# Patient Record
(demographics unavailable — no encounter records)

---

## 2025-04-16 NOTE — HISTORY OF PRESENT ILLNESS
[AJCC Stage: ____] : AJCC Stage: [unfilled] [de-identified] : Mr. Reyesbustillo is a 42 year old gentleman with reported insignificant past medical history of who presents in the setting of weight loss, dizziness and nausea and now found to have significant disease burden in the liver consistent with cholangiocarcinoma vs metastatic disease.  Mr. Reyes reports that he was at his baseline state of health until ~ 3-4 months ago with the onset of nausea and weight loss. He was eventually seen by his PCP who referred him to a hepatologist (Dr. Vance) and underwent CT Scan which identified significant disease burden. He subsequently received MR Liver and biopsy 4/8/25 which was CK7 +, CDX2 negative and concerning for primary intrahepatic cholangiocarcinoma versus metastatic disease of unknown primary.  During hospitalizaiton AST//50 with elevated alk phos, Bili 1-2, AFP 32, CA 19-9 85.Smooth muscle antibody 1:20.   Currently is functional, up and active. Some sleep disturbances and a feeling of being "dazed". Weight loss ~ 20 lbs over the last 4 months.   No significant family history of cancer.   Molecular: Foundation requested Disease: CUP (likely Cholangiocarcinoma)

## 2025-04-16 NOTE — BEGINNING OF VISIT
[Detailed Documented Plan in Note] : Detailed Documented Plan in Note [0] : 2) Feeling down, depressed, or hopeless: Not at all (0) [PHQ-2 Negative] : PHQ-2 Negative [[id number]] : 0 [Never] : Never [Patient advised of risk of tobacco use and smoking cessation discussed.] : Patient advised of risk of tobacco use and smoking cessation discussed. [Date Discussed (MM/DD/YY): ___] : Discussed: [unfilled] [With Patient/Caregiver] : with Patient/Caregiver [Abdominal Pain] : no abdominal pain

## 2025-04-16 NOTE — RESULTS/DATA
[FreeTextEntry1] : FNA Liver 4/8/25: Immunohistochemical stains  : The neoplastic cells are positive for CK7 and CAM 5.2 while  negative  or CK20, HepPar, Arginase, NKX3.1, TTF-1, CDX-2 and P40. RAIN-3 is weak positive in few cells.  Given these immunohistochemical findings, a definite primary site cannot be established; possibilities include upper gastrointestinal, pancreatobiliary (including intrahepatic cholangiocarcinoma) amongst others Correlation with clinical presentation and radiologic studies is advised.

## 2025-04-16 NOTE — ASSESSMENT
[FreeTextEntry1] : Mr. Reyesbustillo is a 42 year old gentleman presenting in the setting of Cancer of Unknown Primary, highly suspicious for intra-hepatic cholangiocarcinoma based on distribution of disease and pathologic findings.   Today we discussed his recent symptomatic burden, the natural history of cholangiocarcinoma, expectations for the future, and the next steps including port placement and initiation of systemic therapy. We went over goals of therapy, broadly, and highlighted the palliative nature of treatment. I highlighted that prognosis was measured in months to potentially a small number of years, but that his overall burden of disease concerned me, as did his declining functional status. We discussed the TOPAZ-1 regimen based on my suspicion that his primary malignancy represents a cholangiocarcinoma, and we discussed the risks, benefits, and side effects of treatment. He and his family were given the opportunity to ask questions. He will followup with Dr. Carolina for the initiation of C1.   #CUP/Cholangiocarcinoma: - Plan for TOPAZ-1; [place] + Cis + Durva - Referral to psycho-oncology (discussion, not medication) - Nutrition referral - PET/CT r/o extra-hepatic disease and alternative primary site (confirm liver-only) - Port - Foundation testing on tumor tissue - Not interested in fertility preservation

## 2025-04-16 NOTE — REASON FOR VISIT
[Initial Consultation] : an initial consultation [Interpreters_IDNumber] : [id number] [Interpreters_FullName] : Earnestine

## 2025-04-16 NOTE — HISTORY OF PRESENT ILLNESS
[AJCC Stage: ____] : AJCC Stage: [unfilled] [de-identified] : Mr. Reyesbustillo is a 42 year old gentleman with reported insignificant past medical history of who presents in the setting of weight loss, dizziness and nausea and now found to have significant disease burden in the liver consistent with cholangiocarcinoma vs metastatic disease.  Mr. Reyes reports that he was at his baseline state of health until ~ 3-4 months ago with the onset of nausea and weight loss. He was eventually seen by his PCP who referred him to a hepatologist (Dr. Vance) and underwent CT Scan which identified significant disease burden. He subsequently received MR Liver and biopsy 4/8/25 which was CK7 +, CDX2 negative and concerning for primary intrahepatic cholangiocarcinoma versus metastatic disease of unknown primary.  During hospitalizaiton AST//50 with elevated alk phos, Bili 1-2, AFP 32, CA 19-9 85.Smooth muscle antibody 1:20.   Currently is functional, up and active. Some sleep disturbances and a feeling of being "dazed". Weight loss ~ 20 lbs over the last 4 months.   No significant family history of cancer.   Molecular: Foundation requested Disease: CUP (likely Cholangiocarcinoma)

## 2025-04-16 NOTE — HISTORY OF PRESENT ILLNESS
[AJCC Stage: ____] : AJCC Stage: [unfilled] [de-identified] : Mr. Reyesbustillo is a 42 year old gentleman with reported insignificant past medical history of who presents in the setting of weight loss, dizziness and nausea and now found to have significant disease burden in the liver consistent with cholangiocarcinoma vs metastatic disease.  Mr. Reyes reports that he was at his baseline state of health until ~ 3-4 months ago with the onset of nausea and weight loss. He was eventually seen by his PCP who referred him to a hepatologist (Dr. Vance) and underwent CT Scan which identified significant disease burden. He subsequently received MR Liver and biopsy 4/8/25 which was CK7 +, CDX2 negative and concerning for primary intrahepatic cholangiocarcinoma versus metastatic disease of unknown primary.  During hospitalizaiton AST//50 with elevated alk phos, Bili 1-2, AFP 32, CA 19-9 85.Smooth muscle antibody 1:20.   Currently is functional, up and active. Some sleep disturbances and a feeling of being "dazed". Weight loss ~ 20 lbs over the last 4 months.   No significant family history of cancer.   Molecular: Foundation requested Disease: CUP (likely Cholangiocarcinoma)

## 2025-04-16 NOTE — PHYSICAL EXAM
[Restricted in physically strenuous activity but ambulatory and able to carry out work of a light or sedentary nature] : Status 1- Restricted in physically strenuous activity but ambulatory and able to carry out work of a light or sedentary nature, e.g., light house work, office work [Normal] : grossly intact [de-identified] : Jaundiced [de-identified] : Icteric [de-identified] : Distended abdomen [de-identified] : Jaundiced  [de-identified] : Tearful Tearful

## 2025-04-16 NOTE — REASON FOR VISIT
[Initial Consultation] : an initial consultation [Interpreters_IDNumber] : [id number] [Interpreters_FullName] : Earnestine

## 2025-04-16 NOTE — PHYSICAL EXAM
[Restricted in physically strenuous activity but ambulatory and able to carry out work of a light or sedentary nature] : Status 1- Restricted in physically strenuous activity but ambulatory and able to carry out work of a light or sedentary nature, e.g., light house work, office work [Normal] : grossly intact [de-identified] : Jaundiced [de-identified] : Icteric [de-identified] : Distended abdomen [de-identified] : Jaundiced  [de-identified] : Tearful Tearful

## 2025-04-16 NOTE — PHYSICAL EXAM
[Restricted in physically strenuous activity but ambulatory and able to carry out work of a light or sedentary nature] : Status 1- Restricted in physically strenuous activity but ambulatory and able to carry out work of a light or sedentary nature, e.g., light house work, office work [Normal] : grossly intact [de-identified] : Jaundiced [de-identified] : Icteric [de-identified] : Distended abdomen [de-identified] : Jaundiced  [de-identified] : Tearful Tearful

## 2025-04-16 NOTE — ASSESSMENT
[FreeTextEntry1] : Mr. Reyesbustillo is a 42 year old gentleman presenting in the setting of Cancer of Unknown Primary, highly suspicious for intra-hepatic cholangiocarcinoma based on distribution of disease and pathologic findings.   Today we discussed his recent symptomatic burden, the natural history of cholangiocarcinoma, expectations for the future, and the next steps including port placement and initiation of systemic therapy. We went over goals of therapy, broadly, and highlighted the palliative nature of treatment. I highlighted that prognosis was measured in months to potentially a small number of years, but that his overall burden of disease concerned me, as did his declining functional status. We discussed the TOPAZ-1 regimen based on my suspicion that his primary malignancy represents a cholangiocarcinoma, and we discussed the risks, benefits, and side effects of treatment. He and his family were given the opportunity to ask questions. He will followup with Dr. Carolina for the initiation of C1.   #CUP/Cholangiocarcinoma: - Plan for TOPAZ-1; [place] + Cis + Durva - Referral to psycho-oncology (discussion, not medication) - Nutrition referral - PET/CT r/o extra-hepatic disease and alternative primary site (confirm liver-only) - Port - Foundation testing on tumor tissue - Not interested in fertility preservation

## 2025-04-16 NOTE — REASON FOR VISIT
[Initial Consultation] : an initial consultation [Interpreters_IDNumber] : [id number] [Interpreters_FullName] : Earnestine

## 2025-04-16 NOTE — BEGINNING OF VISIT
[Detailed Documented Plan in Note] : Detailed Documented Plan in Note [0] : 2) Feeling down, depressed, or hopeless: Not at all (0) [PHQ-2 Negative] : PHQ-2 Negative [[id number]] : 0 [Never] : Never [Patient advised of risk of tobacco use and smoking cessation discussed.] : Patient advised of risk of tobacco use and smoking cessation discussed. [Date Discussed (MM/DD/YY): ___] : Discussed: [unfilled] [With Patient/Caregiver] : with Patient/Caregiver [Abdominal Pain] : no abdominal pain

## 2025-04-16 NOTE — ASSESSMENT
[FreeTextEntry1] : Mr. Reyesbustillo is a 42 year old gentleman presenting in the setting of Cancer of Unknown Primary, highly suspicious for intra-hepatic cholangiocarcinoma based on distribution of disease and pathologic findings.   Today we discussed his recent symptomatic burden, the natural history of cholangiocarcinoma, expectations for the future, and the next steps including port placement and initiation of systemic therapy. We went over goals of therapy, broadly, and highlighted the palliative nature of treatment. I highlighted that prognosis was measured in months to potentially a small number of years, but that his overall burden of disease concerned me, as did his declining functional status. We discussed the TOPAZ-1 regimen based on my suspicion that his primary malignancy represents a cholangiocarcinoma, and we discussed the risks, benefits, and side effects of treatment. He and his family were given the opportunity to ask questions. He will followup with Dr. Carolina for the initiation of C1.   #CUP/Cholangiocarcinoma: - Plan for TOPAZ-1; [place] + Cis + Durva - Referral to psycho-oncology (discussion, not medication) - Nutrition referral - PET/CT r/o extra-hepatic disease and alternative primary site (confirm liver-only) - Port - Foundation testing on tumor tissue - Not interested in fertility preservation

## 2025-04-16 NOTE — BEGINNING OF VISIT
[Detailed Documented Plan in Note] : Detailed Documented Plan in Note [0] : 2) Feeling down, depressed, or hopeless: Not at all (0) [PHQ-2 Negative] : PHQ-2 Negative [[id number]] : 0 [Never] : Never [Patient advised of risk of tobacco use and smoking cessation discussed.] : Patient advised of risk of tobacco use and smoking cessation discussed. [Date Discussed (MM/DD/YY): ___] : Discussed: [unfilled] [With Patient/Caregiver] : with Patient/Caregiver [Abdominal Pain] : no abdominal pain

## 2025-04-23 NOTE — PROCEDURE
[FreeTextEntry1] : mediport insertion [D/C IV on discharge] : D/C IV on discharge [Resume diet] : resume diet [Site check for bleeding/hematoma] : Site check for bleeding/hematoma [Vital signs on admission the q 15 mins x2] : Vital signs on admission the q 15 mins x2

## 2025-04-23 NOTE — PAST MEDICAL HISTORY
[Increasing age ( >40 years old)] : Increasing age ( >40 years old) [Malignancy] : Malignancy [No therapy indicated for cases scheduled for less than one hour] : No therapy indicated for cases scheduled for less than one hour. [FreeTextEntry1] : Malignant Hyperthermia Screening Tool and Risk of Bleeding Assessment  Mr. ALEXI REYESBUSTILLO denies family history of unexpected death following Anesthesia or Exercise. Denies Family history of Malignant Hyperthermia, Muscle or Neuromuscular disorder and High Temperature following exercise.  Mr. ALEXI REYESBUSTILLO denies history of Muscle Spasm, Dark or Chocolate - Colored urine and Unanticipated fever immediately following anesthesia or serious exercise.  Mr. REYESBUSTILLO also denies bleeding tendencies/ Risks of Bleeding.

## 2025-05-01 NOTE — PHYSICAL EXAM
[Restricted in physically strenuous activity but ambulatory and able to carry out work of a light or sedentary nature] : Status 1- Restricted in physically strenuous activity but ambulatory and able to carry out work of a light or sedentary nature, e.g., light house work, office work [Normal] : grossly intact [de-identified] : Jaundiced [de-identified] : Icteric [de-identified] : Distended abdomen [de-identified] : Jaundiced  [de-identified] : Tearful Tearful

## 2025-05-01 NOTE — HISTORY OF PRESENT ILLNESS
[AJCC Stage: ____] : AJCC Stage: [unfilled] [Disease: _____________________] : Disease: [unfilled] [M: ___] : M[unfilled] [de-identified] : Mr. Reyesbustillo is a 42 year old gentleman with reported insignificant past medical history of who presents in the setting of weight loss, dizziness and nausea and now found to have significant disease burden in the liver consistent with cholangiocarcinoma vs metastatic disease.  Mr. Reyes reports that he was at his baseline state of health until ~ 3-4 months ago with the onset of nausea and weight loss. He was eventually seen by his PCP who referred him to a hepatologist (Dr. Vance) and underwent CT Scan which identified significant disease burden. He subsequently received MR Liver and biopsy 4/8/25 which was CK7 +, CDX2 negative and concerning for primary intrahepatic cholangiocarcinoma versus metastatic disease of unknown primary.  During hospitalizaiton AST//50 with elevated alk phos, Bili 1-2, AFP 32, CA 19-9 85.Smooth muscle antibody 1:20.   Currently is functional, up and active. Some sleep disturbances and a feeling of being "dazed". Weight loss ~ 20 lbs over the last 4 months.      Molecular: Foundation requested Disease: CUP (likely Cholangiocarcinoma) [de-identified] : adenocarcinoma  [de-identified] : CA 19-9, AFP  [FreeTextEntry1] : initial visit  [de-identified] : denies any pain today  lost 6 lbs in 3 weeks  no n/v lives with dad, aunt in [place]  + blurry vision x 3 mos  no diarrhea/constipation  no EGD/colo

## 2025-05-01 NOTE — PHYSICAL EXAM
[Restricted in physically strenuous activity but ambulatory and able to carry out work of a light or sedentary nature] : Status 1- Restricted in physically strenuous activity but ambulatory and able to carry out work of a light or sedentary nature, e.g., light house work, office work [Normal] : grossly intact [de-identified] : Jaundiced [de-identified] : Icteric [de-identified] : Distended abdomen [de-identified] : Jaundiced  [de-identified] : Tearful Tearful

## 2025-05-01 NOTE — HISTORY OF PRESENT ILLNESS
[AJCC Stage: ____] : AJCC Stage: [unfilled] [Disease: _____________________] : Disease: [unfilled] [M: ___] : M[unfilled] [de-identified] : Mr. Reyesbustillo is a 42 year old gentleman with reported insignificant past medical history of who presents in the setting of weight loss, dizziness and nausea and now found to have significant disease burden in the liver consistent with cholangiocarcinoma vs metastatic disease.  Mr. Reyes reports that he was at his baseline state of health until ~ 3-4 months ago with the onset of nausea and weight loss. He was eventually seen by his PCP who referred him to a hepatologist (Dr. Vance) and underwent CT Scan which identified significant disease burden. He subsequently received MR Liver and biopsy 4/8/25 which was CK7 +, CDX2 negative and concerning for primary intrahepatic cholangiocarcinoma versus metastatic disease of unknown primary.  During hospitalizaiton AST//50 with elevated alk phos, Bili 1-2, AFP 32, CA 19-9 85.Smooth muscle antibody 1:20.   Currently is functional, up and active. Some sleep disturbances and a feeling of being "dazed". Weight loss ~ 20 lbs over the last 4 months.      Molecular: Foundation requested Disease: CUP (likely Cholangiocarcinoma) [de-identified] : adenocarcinoma  [de-identified] : CA 19-9, AFP  [FreeTextEntry1] : initial visit  [de-identified] : denies any pain today  lost 6 lbs in 3 weeks  no n/v lives with dad, aunt in [place]  + blurry vision x 3 mos  no diarrhea/constipation  no EGD/colo

## 2025-05-01 NOTE — REASON FOR VISIT
[Initial Consultation] : an initial consultation [Family Member] : family member [FreeTextEntry2] : Stage IV cholangiocarcinoma

## 2025-05-08 NOTE — HISTORY OF PRESENT ILLNESS
[Disease: _____________________] : Disease: [unfilled] [M: ___] : M[unfilled] [AJCC Stage: ____] : AJCC Stage: [unfilled] [Therapy: ___] : Therapy: [unfilled] [Cycle: ___] : Cycle: [unfilled] [Day: ___] : Day: [unfilled] [de-identified] : Mr. Reyesbustillo is a 42 year old gentleman with reported insignificant past medical history of who presents in the setting of weight loss, dizziness and nausea and now found to have significant disease burden in the liver consistent with cholangiocarcinoma vs metastatic disease.  Mr. Reyes reports that he was at his baseline state of health until ~ 3-4 months ago with the onset of nausea and weight loss. He was eventually seen by his PCP who referred him to a hepatologist (Dr. Vance) and underwent CT Scan which identified significant disease burden. He subsequently received MR Liver and biopsy 4/8/25 which was CK7 +, CDX2 negative and concerning for primary intrahepatic cholangiocarcinoma versus metastatic disease of unknown primary.  During hospitalizaiton AST//50 with elevated alk phos, Bili 1-2, AFP 32, CA 19-9 85.Smooth muscle antibody 1:20.   Currently is functional, up and active. Some sleep disturbances and a feeling of being "dazed". Weight loss ~ 20 lbs over the last 4 months.      Molecular: Foundation requested Disease: CUP (likely Cholangiocarcinoma)  5/1/25: C1D1 Gemzar/Cisplatin/Durvalumab  5/8/25: C1D8  [de-identified] : adenocarcinoma  [de-identified] : CA 19-9, AFP  [de-identified] : Here for treatment. Accompanied by his father  Tolerated treatment pretty well  Did feel his voice was lower in volume that has since improved  Otherwise no new compaints  Denies v/c/d/c, pain

## 2025-05-08 NOTE — REASON FOR VISIT
[Follow-Up Visit] : a follow-up [Family Member] : family member [FreeTextEntry2] : Stage IV cholangiocarcinoma

## 2025-05-08 NOTE — PHYSICAL EXAM
[Restricted in physically strenuous activity but ambulatory and able to carry out work of a light or sedentary nature] : Status 1- Restricted in physically strenuous activity but ambulatory and able to carry out work of a light or sedentary nature, e.g., light house work, office work [Normal] : affect appropriate [de-identified] : Icteric [de-identified] : Distended abdomen [de-identified] :  Tearful

## 2025-05-08 NOTE — REVIEW OF SYSTEMS
[Fatigue] : fatigue [Diarrhea: Grade 0] : Diarrhea: Grade 0 [Negative] : Allergic/Immunologic [FreeTextEntry4] : +dysphonia

## 2025-05-14 NOTE — PHYSICAL EXAM
[Restricted in physically strenuous activity but ambulatory and able to carry out work of a light or sedentary nature] : Status 1- Restricted in physically strenuous activity but ambulatory and able to carry out work of a light or sedentary nature, e.g., light house work, office work [Normal] : affect appropriate [de-identified] : Icteric [de-identified] : Distended abdomen [de-identified] :  Tearful

## 2025-05-14 NOTE — HISTORY OF PRESENT ILLNESS
[Disease: _____________________] : Disease: [unfilled] [M: ___] : M[unfilled] [AJCC Stage: ____] : AJCC Stage: [unfilled] [Therapy: ___] : Therapy: [unfilled] [Cycle: ___] : Cycle: [unfilled] [Day: ___] : Day: [unfilled] [de-identified] : Mr. Reyesbustillo is a 42 year old gentleman with reported insignificant past medical history of who presents in the setting of weight loss, dizziness and nausea and now found to have significant disease burden in the liver consistent with cholangiocarcinoma vs metastatic disease.  Mr. Reyes reports that he was at his baseline state of health until ~ 3-4 months ago with the onset of nausea and weight loss. He was eventually seen by his PCP who referred him to a hepatologist (Dr. Vance) and underwent CT Scan which identified significant disease burden. He subsequently received MR Liver and biopsy 4/8/25 which was CK7 +, CDX2 negative and concerning for primary intrahepatic cholangiocarcinoma versus metastatic disease of unknown primary.  During hospitalizaiton AST//50 with elevated alk phos, Bili 1-2, AFP 32, CA 19-9 85.Smooth muscle antibody 1:20.   Currently is functional, up and active. Some sleep disturbances and a feeling of being "dazed". Weight loss ~ 20 lbs over the last 4 months.       5/1/25: C1D1 Gemzar/Cisplatin/Durvalumab  5/8/25: C1D8  [de-identified] : adenocarcinoma  [de-identified] : CA 19-9, AFP  [de-identified] : was confused about meds and took dex 8 daily for 10 days, stopped 3 days ago when identified the error.  overall feels ok, appetite has improved, weight is stable.  BP remains elevated energy level is stable  tolerated first cycle of treatment well  yesterday felt like abdomen was distended, tight. today is better.  + belching, passing gas

## 2025-05-14 NOTE — PHYSICAL EXAM
[Restricted in physically strenuous activity but ambulatory and able to carry out work of a light or sedentary nature] : Status 1- Restricted in physically strenuous activity but ambulatory and able to carry out work of a light or sedentary nature, e.g., light house work, office work [Normal] : affect appropriate [de-identified] : Icteric [de-identified] : Distended abdomen [de-identified] :  Tearful

## 2025-05-14 NOTE — HISTORY OF PRESENT ILLNESS
[Disease: _____________________] : Disease: [unfilled] [M: ___] : M[unfilled] [AJCC Stage: ____] : AJCC Stage: [unfilled] [Therapy: ___] : Therapy: [unfilled] [Cycle: ___] : Cycle: [unfilled] [Day: ___] : Day: [unfilled] [de-identified] : Mr. Reyesbustillo is a 42 year old gentleman with reported insignificant past medical history of who presents in the setting of weight loss, dizziness and nausea and now found to have significant disease burden in the liver consistent with cholangiocarcinoma vs metastatic disease.  Mr. Reyes reports that he was at his baseline state of health until ~ 3-4 months ago with the onset of nausea and weight loss. He was eventually seen by his PCP who referred him to a hepatologist (Dr. Vance) and underwent CT Scan which identified significant disease burden. He subsequently received MR Liver and biopsy 4/8/25 which was CK7 +, CDX2 negative and concerning for primary intrahepatic cholangiocarcinoma versus metastatic disease of unknown primary.  During hospitalizaiton AST//50 with elevated alk phos, Bili 1-2, AFP 32, CA 19-9 85.Smooth muscle antibody 1:20.   Currently is functional, up and active. Some sleep disturbances and a feeling of being "dazed". Weight loss ~ 20 lbs over the last 4 months.       5/1/25: C1D1 Gemzar/Cisplatin/Durvalumab  5/8/25: C1D8  [de-identified] : adenocarcinoma  [de-identified] : CA 19-9, AFP  [de-identified] : was confused about meds and took dex 8 daily for 10 days, stopped 3 days ago when identified the error.  overall feels ok, appetite has improved, weight is stable.  BP remains elevated energy level is stable  tolerated first cycle of treatment well  yesterday felt like abdomen was distended, tight. today is better.  + belching, passing gas

## 2025-05-30 NOTE — PHYSICAL EXAM
[Restricted in physically strenuous activity but ambulatory and able to carry out work of a light or sedentary nature] : Status 1- Restricted in physically strenuous activity but ambulatory and able to carry out work of a light or sedentary nature, e.g., light house work, office work [Normal] : affect appropriate [de-identified] : Icteric [de-identified] : CTA b/l w/ decrease breath sounds LLL  [de-identified] : +tachycardia  [de-identified] : Distended abdomen [de-identified] :  Tearful

## 2025-05-30 NOTE — PHYSICAL EXAM
[Restricted in physically strenuous activity but ambulatory and able to carry out work of a light or sedentary nature] : Status 1- Restricted in physically strenuous activity but ambulatory and able to carry out work of a light or sedentary nature, e.g., light house work, office work [Normal] : affect appropriate [de-identified] : Icteric [de-identified] : CTA b/l w/ decrease breath sounds LLL  [de-identified] : +tachycardia  [de-identified] : Distended abdomen [de-identified] :  Tearful

## 2025-05-30 NOTE — REVIEW OF SYSTEMS
[Fatigue] : fatigue [Diarrhea: Grade 0] : Diarrhea: Grade 0 [Negative] : Allergic/Immunologic [Cough] : cough [Abdominal Pain] : abdominal pain [Shortness Of Breath] : no shortness of breath [Wheezing] : no wheezing [SOB on Exertion] : no shortness of breath during exertion [FreeTextEntry4] : +dysphonia

## 2025-05-30 NOTE — HISTORY OF PRESENT ILLNESS
[Disease: _____________________] : Disease: [unfilled] [M: ___] : M[unfilled] [AJCC Stage: ____] : AJCC Stage: [unfilled] [Therapy: ___] : Therapy: [unfilled] [Cycle: ___] : Cycle: [unfilled] [Day: ___] : Day: [unfilled] [de-identified] : Mr. Reyesbustillo is a 42 year old gentleman with reported insignificant past medical history of who presents in the setting of weight loss, dizziness and nausea and now found to have significant disease burden in the liver consistent with cholangiocarcinoma vs metastatic disease.  Mr. Reyes reports that he was at his baseline state of health until ~ 3-4 months ago with the onset of nausea and weight loss. He was eventually seen by his PCP who referred him to a hepatologist (Dr. Vance) and underwent CT Scan which identified significant disease burden. He subsequently received MR Liver and biopsy 4/8/25 which was CK7 +, CDX2 negative and concerning for primary intrahepatic cholangiocarcinoma versus metastatic disease of unknown primary.  During hospitalizaiton AST//50 with elevated alk phos, Bili 1-2, AFP 32, CA 19-9 85.Smooth muscle antibody 1:20.   Currently is functional, up and active. Some sleep disturbances and a feeling of being "dazed". Weight loss ~ 20 lbs over the last 4 months.       5/1/25: C1D1 Gemzar/Cisplatin/Durvalumab  5/8/25: C1D8  5/22/25: C2D1 5/29/25: C2D8  [de-identified] : adenocarcinoma  [de-identified] : CA 19-9, AFP  [de-identified] : Progressive cough x 3 days w/ green sputum  Worse at night, when he lays flat  no fever or chills, no sick contacts  Feels a bit tired  Denies shortness of breath, no chest pain or palpitations  Abdominal pain  has not taken anything for the cough  +bloating, does feel like his belly is getting larger  no pruritus Urine is tea-colored. Noticed about two weeks.  Stool brown but sometimes with more yellow

## 2025-05-30 NOTE — HISTORY OF PRESENT ILLNESS
[Disease: _____________________] : Disease: [unfilled] [M: ___] : M[unfilled] [AJCC Stage: ____] : AJCC Stage: [unfilled] [Therapy: ___] : Therapy: [unfilled] [Cycle: ___] : Cycle: [unfilled] [Day: ___] : Day: [unfilled] [de-identified] : Mr. Reyesbustillo is a 42 year old gentleman with reported insignificant past medical history of who presents in the setting of weight loss, dizziness and nausea and now found to have significant disease burden in the liver consistent with cholangiocarcinoma vs metastatic disease.  Mr. Reyes reports that he was at his baseline state of health until ~ 3-4 months ago with the onset of nausea and weight loss. He was eventually seen by his PCP who referred him to a hepatologist (Dr. Vance) and underwent CT Scan which identified significant disease burden. He subsequently received MR Liver and biopsy 4/8/25 which was CK7 +, CDX2 negative and concerning for primary intrahepatic cholangiocarcinoma versus metastatic disease of unknown primary.  During hospitalizaiton AST//50 with elevated alk phos, Bili 1-2, AFP 32, CA 19-9 85.Smooth muscle antibody 1:20.   Currently is functional, up and active. Some sleep disturbances and a feeling of being "dazed". Weight loss ~ 20 lbs over the last 4 months.       5/1/25: C1D1 Gemzar/Cisplatin/Durvalumab  5/8/25: C1D8  5/22/25: C2D1 5/29/25: C2D8  [de-identified] : adenocarcinoma  [de-identified] : CA 19-9, AFP  [de-identified] : Progressive cough x 3 days w/ green sputum  Worse at night, when he lays flat  no fever or chills, no sick contacts  Feels a bit tired  Denies shortness of breath, no chest pain or palpitations  Abdominal pain  has not taken anything for the cough  +bloating, does feel like his belly is getting larger  no pruritus Urine is tea-colored. Noticed about two weeks.  Stool brown but sometimes with more yellow

## 2025-06-12 NOTE — REASON FOR VISIT
[Follow-Up Visit] : a follow-up [Family Member] : family member [FreeTextEntry2] : Stage IV cholangiocarcinoma  [Interpreters_IDNumber] : [id number] [Interpreters_FullName] : Juan

## 2025-06-12 NOTE — HISTORY OF PRESENT ILLNESS
[Disease: _____________________] : Disease: [unfilled] [M: ___] : M[unfilled] [AJCC Stage: ____] : AJCC Stage: [unfilled] [Therapy: ___] : Therapy: [unfilled] [Cycle: ___] : Cycle: [unfilled] [Day: ___] : Day: [unfilled] [de-identified] : Mr. Reyesbustillo is a 42 year old gentleman with reported insignificant past medical history of who presents in the setting of weight loss, dizziness and nausea and now found to have significant disease burden in the liver consistent with cholangiocarcinoma vs metastatic disease.  Mr. Reyes reports that he was at his baseline state of health until ~ 3-4 months ago with the onset of nausea and weight loss. He was eventually seen by his PCP who referred him to a hepatologist (Dr. Vance) and underwent CT Scan which identified significant disease burden. He subsequently received MR Liver and biopsy 4/8/25 which was CK7 +, CDX2 negative and concerning for primary intrahepatic cholangiocarcinoma versus metastatic disease of unknown primary.  During hospitalizaiton AST//50 with elevated alk phos, Bili 1-2, AFP 32, CA 19-9 85.Smooth muscle antibody 1:20.   Currently is functional, up and active. Some sleep disturbances and a feeling of being "dazed". Weight loss ~ 20 lbs over the last 4 months.   5/1/25: C1D1 Gemzar/Cisplatin/Durvalumab  5/8/25: C1D8  5/22/25: C2D1 5/29/25: C2D8  6/1/25-6/4/25: admitted for sepsis 2/2 infectious diarrhea. ID consulted, patient was monitored off antibiotics. Paracentesis performed 6/3, 3L removed, SAAG >1.1, no SBP. 6/1/25: CT CAP: Since 4/4/2025, there has been worsening of large ascites. New mild pleural thickening and nodularity laterally at left base, suspicious for pleural metastatic disease. Decrease in the size of the liver and in the size of largest liver mass. However, there are still multiple smaller liver masses which are similar in size. Mild retroperitoneal lymphadenopathy. A mildly dilated small bowel loop is present in the right lower quadrant. This could represent a focal ileus, but evaluation is limited without oral contrast material. 6/12/25: C3D1 [de-identified] : adenocarcinoma  [de-identified] : CA 19-9, AFP  [de-identified] : Feeling much better since admission  Diarrhea and cough resolved  No complaints of bloating or abdominal pain. No edema  Eating well  Performing all ADLs indepedently.

## 2025-06-12 NOTE — PHYSICAL EXAM
[Restricted in physically strenuous activity but ambulatory and able to carry out work of a light or sedentary nature] : Status 1- Restricted in physically strenuous activity but ambulatory and able to carry out work of a light or sedentary nature, e.g., light house work, office work [Normal] : normal appearance, no rash, nodules, vesicles, ulcers, erythema [de-identified] : normal respiratory pattern  [de-identified] : +tachycardia

## 2025-06-20 NOTE — REVIEW OF SYSTEMS
[Diarrhea: Grade 0] : Diarrhea: Grade 0 [Negative] : Allergic/Immunologic [FreeTextEntry4] : +tinnitus  [FreeTextEntry7] : +bloating

## 2025-06-20 NOTE — REASON FOR VISIT
[Follow-Up Visit] : a follow-up [Family Member] : family member [[employer] ] : provided by [employer]   [FreeTextEntry2] : Stage IV cholangiocarcinoma  [Interpreters_IDNumber] : [id number] [Interpreters_FullName] : Pj

## 2025-06-20 NOTE — PHYSICAL EXAM
[Restricted in physically strenuous activity but ambulatory and able to carry out work of a light or sedentary nature] : Status 1- Restricted in physically strenuous activity but ambulatory and able to carry out work of a light or sedentary nature, e.g., light house work, office work [Normal] : affect appropriate [de-identified] : normal respiratory pattern  [de-identified] : +tachycardia

## 2025-06-20 NOTE — REASON FOR VISIT
[Follow-Up Visit] : a follow-up [Family Member] : family member [Language Line ] : provided by Language Line   [FreeTextEntry2] : Stage IV cholangiocarcinoma  [Interpreters_IDNumber] : [id number] [Interpreters_FullName] : Pj

## 2025-06-20 NOTE — HISTORY OF PRESENT ILLNESS
[Disease: _____________________] : Disease: [unfilled] [M: ___] : M[unfilled] [AJCC Stage: ____] : AJCC Stage: [unfilled] [Therapy: ___] : Therapy: [unfilled] [Cycle: ___] : Cycle: [unfilled] [Day: ___] : Day: [unfilled] [de-identified] : Mr. Reyesbustillo is a 42 year old gentleman with reported insignificant past medical history of who presents in the setting of weight loss, dizziness and nausea and now found to have significant disease burden in the liver consistent with cholangiocarcinoma vs metastatic disease.  Mr. Reyes reports that he was at his baseline state of health until ~ 3-4 months ago with the onset of nausea and weight loss. He was eventually seen by his PCP who referred him to a hepatologist (Dr. Vance) and underwent CT Scan which identified significant disease burden. He subsequently received MR Liver and biopsy 4/8/25 which was CK7 +, CDX2 negative and concerning for primary intrahepatic cholangiocarcinoma versus metastatic disease of unknown primary.  During hospitalizaiton AST//50 with elevated alk phos, Bili 1-2, AFP 32, CA 19-9 85.Smooth muscle antibody 1:20.   Currently is functional, up and active. Some sleep disturbances and a feeling of being "dazed". Weight loss ~ 20 lbs over the last 4 months.   5/1/25: C1D1 Gemzar/Cisplatin/Durvalumab  5/8/25: C1D8  5/22/25: C2D1 5/29/25: C2D8  6/1/25-6/4/25: admitted for sepsis 2/2 infectious diarrhea. ID consulted, patient was monitored off antibiotics. Paracentesis performed 6/3, 3L removed, SAAG >1.1, no SBP. 6/1/25: CT CAP: Since 4/4/2025, there has been worsening of large ascites. New mild pleural thickening and nodularity laterally at left base, suspicious for pleural metastatic disease. Decrease in the size of the liver and in the size of largest liver mass. However, there are still multiple smaller liver masses which are similar in size. Mild retroperitoneal lymphadenopathy. A mildly dilated small bowel loop is present in the right lower quadrant. This could represent a focal ileus, but evaluation is limited without oral contrast material. 6/12/25: C3D1 6/19/25:  C3D8 [de-identified] : adenocarcinoma  [de-identified] : CA 19-9, AFP  [de-identified] : Noting intermittent low volume ringing in his right ear x 4 days. Happens at random times. Has noticed when he is outside or in the car. Lasts about 10 minutes. No pain, fever/chills  About one month ago, felt his ear was clogged that resolved on its own  No ringing in his ear during exam today  C/o feeling bloated again. Of note, he ran out of pantoprazole 3 days ago

## 2025-06-20 NOTE — HISTORY OF PRESENT ILLNESS
[Disease: _____________________] : Disease: [unfilled] [M: ___] : M[unfilled] [AJCC Stage: ____] : AJCC Stage: [unfilled] [Therapy: ___] : Therapy: [unfilled] [Cycle: ___] : Cycle: [unfilled] [Day: ___] : Day: [unfilled] [de-identified] : Mr. Reyesbustillo is a 42 year old gentleman with reported insignificant past medical history of who presents in the setting of weight loss, dizziness and nausea and now found to have significant disease burden in the liver consistent with cholangiocarcinoma vs metastatic disease.  Mr. Reyes reports that he was at his baseline state of health until ~ 3-4 months ago with the onset of nausea and weight loss. He was eventually seen by his PCP who referred him to a hepatologist (Dr. Vance) and underwent CT Scan which identified significant disease burden. He subsequently received MR Liver and biopsy 4/8/25 which was CK7 +, CDX2 negative and concerning for primary intrahepatic cholangiocarcinoma versus metastatic disease of unknown primary.  During hospitalizaiton AST//50 with elevated alk phos, Bili 1-2, AFP 32, CA 19-9 85.Smooth muscle antibody 1:20.   Currently is functional, up and active. Some sleep disturbances and a feeling of being "dazed". Weight loss ~ 20 lbs over the last 4 months.   5/1/25: C1D1 Gemzar/Cisplatin/Durvalumab  5/8/25: C1D8  5/22/25: C2D1 5/29/25: C2D8  6/1/25-6/4/25: admitted for sepsis 2/2 infectious diarrhea. ID consulted, patient was monitored off antibiotics. Paracentesis performed 6/3, 3L removed, SAAG >1.1, no SBP. 6/1/25: CT CAP: Since 4/4/2025, there has been worsening of large ascites. New mild pleural thickening and nodularity laterally at left base, suspicious for pleural metastatic disease. Decrease in the size of the liver and in the size of largest liver mass. However, there are still multiple smaller liver masses which are similar in size. Mild retroperitoneal lymphadenopathy. A mildly dilated small bowel loop is present in the right lower quadrant. This could represent a focal ileus, but evaluation is limited without oral contrast material. 6/12/25: C3D1 6/19/25:  C3D8 [de-identified] : adenocarcinoma  [de-identified] : CA 19-9, AFP  [de-identified] : Noting intermittent low volume ringing in his right ear x 4 days. Happens at random times. Has noticed when he is outside or in the car. Lasts about 10 minutes. No pain, fever/chills  About one month ago, felt his ear was clogged that resolved on its own  No ringing in his ear during exam today  C/o feeling bloated again. Of note, he ran out of pantoprazole 3 days ago

## 2025-06-20 NOTE — PHYSICAL EXAM
[Restricted in physically strenuous activity but ambulatory and able to carry out work of a light or sedentary nature] : Status 1- Restricted in physically strenuous activity but ambulatory and able to carry out work of a light or sedentary nature, e.g., light house work, office work [Normal] : affect appropriate [de-identified] : normal respiratory pattern  [de-identified] : +tachycardia

## 2025-06-27 NOTE — REASON FOR VISIT
[Follow-Up Visit] : a follow-up [Family Member] : family member [Language Line ] : provided by Language Line   [FreeTextEntry2] : Stage IV cholangiocarcinoma  [Interpreters_IDNumber] : [id number] [Interpreters_FullName] : Jose A

## 2025-06-27 NOTE — REVIEW OF SYSTEMS
[Diarrhea: Grade 0] : Diarrhea: Grade 0 [Negative] : Allergic/Immunologic [FreeTextEntry4] : +tinnitus

## 2025-06-27 NOTE — PHYSICAL EXAM
[Restricted in physically strenuous activity but ambulatory and able to carry out work of a light or sedentary nature] : Status 1- Restricted in physically strenuous activity but ambulatory and able to carry out work of a light or sedentary nature, e.g., light house work, office work [Normal] : clear to auscultation bilaterally, no dullness, no wheezing [de-identified] : +tachycardia  [de-identified] : mild TTP RUQ

## 2025-06-27 NOTE — HISTORY OF PRESENT ILLNESS
[Disease: _____________________] : Disease: [unfilled] [M: ___] : M[unfilled] [AJCC Stage: ____] : AJCC Stage: [unfilled] [Therapy: ___] : Therapy: [unfilled] [Cycle: ___] : Cycle: [unfilled] [Day: ___] : Day: [unfilled] [de-identified] : Mr. Reyesbustillo is a 42 year old gentleman with reported insignificant past medical history of who presents in the setting of weight loss, dizziness and nausea and now found to have significant disease burden in the liver consistent with cholangiocarcinoma vs metastatic disease.  Mr. Reyes reports that he was at his baseline state of health until ~ 3-4 months ago with the onset of nausea and weight loss. He was eventually seen by his PCP who referred him to a hepatologist (Dr. Vance) and underwent CT Scan which identified significant disease burden. He subsequently received MR Liver and biopsy 4/8/25 which was CK7 +, CDX2 negative and concerning for primary intrahepatic cholangiocarcinoma versus metastatic disease of unknown primary.  During hospitalizaiton AST//50 with elevated alk phos, Bili 1-2, AFP 32, CA 19-9 85.Smooth muscle antibody 1:20.   Currently is functional, up and active. Some sleep disturbances and a feeling of being "dazed". Weight loss ~ 20 lbs over the last 4 months.   5/1/25: C1D1 Gemzar/Cisplatin/Durvalumab  5/8/25: C1D8  5/22/25: C2D1 5/29/25: C2D8  6/1/25-6/4/25: admitted for sepsis 2/2 infectious diarrhea. ID consulted, patient was monitored off antibiotics. Paracentesis performed 6/3, 3L removed, SAAG >1.1, no SBP. 6/1/25: CT CAP: Since 4/4/2025, there has been worsening of large ascites. New mild pleural thickening and nodularity laterally at left base, suspicious for pleural metastatic disease. Decrease in the size of the liver and in the size of largest liver mass. However, there are still multiple smaller liver masses which are similar in size. Mild retroperitoneal lymphadenopathy. A mildly dilated small bowel loop is present in the right lower quadrant. This could represent a focal ileus, but evaluation is limited without oral contrast material. 6/12/25: C3D1 6/19/25:  C3D8 [de-identified] : adenocarcinoma  [de-identified] : CA 19-9, AFP  [de-identified] : Bloating better since resuming PPI but not resolved  Ringing ongoing, mostly when he is in the car difficulty sleeping

## 2025-07-15 NOTE — ASSESSMENT
[Future Reassessment of Pain Scale] : Future reassessment of pain scale    [Palliative] : Goals of care discussed with patient: Palliative [Palliative Care Plan] : not applicable at this time [FreeTextEntry1] : Patient being seen as per physician's primary plan of care.

## 2025-07-15 NOTE — REVIEW OF SYSTEMS
[Negative] : Allergic/Immunologic [Fatigue] : fatigue [Abdominal Pain] : abdominal pain [Vomiting] : vomiting [Diarrhea: Grade 1 - Increase of <4 stools per day over baseline; mild increase in ostomy output compared to baseline] : Diarrhea: Grade 1 - Increase of <4 stools per day over baseline; mild increase in ostomy output compared to baseline [Constipation] : no constipation

## 2025-07-15 NOTE — REASON FOR VISIT
[Follow-Up Visit] : a follow-up [Family Member] : family member [Patient Declined  Services] : - None: Patient declined  services [Urgent Visit] : an urgent  [FreeTextEntry2] : Stage IV cholangiocarcinoma

## 2025-07-15 NOTE — PHYSICAL EXAM
[Restricted in physically strenuous activity but ambulatory and able to carry out work of a light or sedentary nature] : Status 1- Restricted in physically strenuous activity but ambulatory and able to carry out work of a light or sedentary nature, e.g., light house work, office work [Normal] : affect appropriate [de-identified] : +tachycardia  [de-identified] : distended, mild TTP

## 2025-07-15 NOTE — HISTORY OF PRESENT ILLNESS
[Disease: _____________________] : Disease: [unfilled] [M: ___] : M[unfilled] [AJCC Stage: ____] : AJCC Stage: [unfilled] [Therapy: ___] : Therapy: [unfilled] [Cycle: ___] : Cycle: [unfilled] [Day: ___] : Day: [unfilled] [de-identified] : Mr. Reyesbustillo is a 42 year old gentleman with reported insignificant past medical history of who presents in the setting of weight loss, dizziness and nausea and now found to have significant disease burden in the liver consistent with cholangiocarcinoma vs metastatic disease.  Mr. Reyes reports that he was at his baseline state of health until ~ 3-4 months ago with the onset of nausea and weight loss. He was eventually seen by his PCP who referred him to a hepatologist (Dr. Vance) and underwent CT Scan which identified significant disease burden. He subsequently received MR Liver and biopsy 4/8/25 which was CK7 +, CDX2 negative and concerning for primary intrahepatic cholangiocarcinoma versus metastatic disease of unknown primary.  During hospitalizaiton AST//50 with elevated alk phos, Bili 1-2, AFP 32, CA 19-9 85.Smooth muscle antibody 1:20.   Currently is functional, up and active. Some sleep disturbances and a feeling of being "dazed". Weight loss ~ 20 lbs over the last 4 months.   5/1/25: C1D1 Gemzar/Cisplatin/Durvalumab  5/8/25: C1D8  5/22/25: C2D1 5/29/25: C2D8  6/1/25-6/4/25: admitted for sepsis 2/2 infectious diarrhea. ID consulted, patient was monitored off antibiotics. Paracentesis performed 6/3, 3L removed, SAAG >1.1, no SBP. 6/1/25: CT CAP: Since 4/4/2025, there has been worsening of large ascites. New mild pleural thickening and nodularity laterally at left base, suspicious for pleural metastatic disease. Decrease in the size of the liver and in the size of largest liver mass. However, there are still multiple smaller liver masses which are similar in size. Mild retroperitoneal lymphadenopathy. A mildly dilated small bowel loop is present in the right lower quadrant. This could represent a focal ileus, but evaluation is limited without oral contrast material. 6/12/25: C3D1 6/19/25:  C3D8 7/3/25: C4D1 7/10/25: C4D8 [de-identified] : adenocarcinoma  [de-identified] : CA 19-9, AFP  [de-identified] : Walked in today asking to be seen d/t feeling poorly  C/o diarrhea since 7/13/25. 6 times Sunday, 5 times yesterday. Watery. Admits to foul smell. Afraid to eat d/t diarrhea  C/o Pain low abdominal pain starting 7/12/25. Feels muscular. Saturday, lasted 25 minutes. Sunday 25 minutes. Both days went away on its own. Today, hurts when he moves.  Increased bloating, scheduled for paracentesis today  no fever/chills  Episode of spitting up yellow fluid after he brushed his teeth this morning

## 2025-07-16 NOTE — DATA REVIEWED
[de-identified] : An audiogram was ordered and performed including pure tones, tympanometry and speech testing for the patients complaint of hearing loss I have independently reviewed the patient's audiogram from today and my findings include  AU Hearing -8k hz. AU Tymp A. Pitch Match CNT @ this time due to language barrier

## 2025-07-16 NOTE — DATA REVIEWED
[de-identified] : An audiogram was ordered and performed including pure tones, tympanometry and speech testing for the patients complaint of hearing loss I have independently reviewed the patient's audiogram from today and my findings include  AU Hearing -8k hz. AU Tymp A. Pitch Match CNT @ this time due to language barrier

## 2025-07-16 NOTE — HISTORY OF PRESENT ILLNESS
[de-identified] : 42 yrs old male present for bilateral ear noise and dysphonia for 2 months  States he is undergoing chemotherapy for cholangiocarcinoma Since the start of chemotherapy he has started hearing this noise. Non pulsatile Feels his voice is weak after long days of speaking No history of Smoking Denies any recent ear, nose, throat infection

## 2025-07-16 NOTE — REASON FOR VISIT
[Initial Consultation] : an initial consultation for [Family Member] : family member [FreeTextEntry2] : voice and ears check

## 2025-07-16 NOTE — ASSESSMENT
[FreeTextEntry1] : 42 year M present for Dysphonia 2/2 Mild glottic gap 2/2 weakness, Bilateral Tinnitus   Flexible Laryngoscopy shows no gross mass or lesions in larynx. bilateral vocal fold mobility full and symmetric with mild glottic gap   Personally reviewed audiogram shows AU Hearing -8k hz. AU Tymp A. Pitch Match CNT @ this time due to language barrier  Recommend: Bilateral Tinnitus -Discussed that Hearing Aids may help with relieving some of the tinnitus. Tinnitus usually follows the same pattern of hearing loss and can be attributed to phantom sounds in the brain filling in for the loss of hearing in those particular frequencies -Discussed that Tinnitus usually can be attributed to phantom sounds in the brain filling in for sounds. If the tinnitus is brief only last for a few seconds with no loss of hearing associated. It is usually physiological and can be management conservatively -Discussed notched therapy, masking therapy, cognitive behavioral therapy, factors that may influence tinnitus, and discussed limited benefit of tinnitus supplements. -Tinnitus Hand Out Given -Patient states will try white noise machine  Dysphonia -For patients whose quality of life is significantly impaired or little improvement with voice therapy can consider procedures like injection laryngoplasty to improve closure of vocal folds -Patient states not interested in any interventions at this time wants to see if voice improves as he gets stronger after the completion of chemotherapy   -Return to clinic in 3 months or sooner if new/worsen symptoms present

## 2025-07-16 NOTE — HISTORY OF PRESENT ILLNESS
[de-identified] : 42 yrs old male present for bilateral ear noise and dysphonia for 2 months  States he is undergoing chemotherapy for cholangiocarcinoma Since the start of chemotherapy he has started hearing this noise. Non pulsatile Feels his voice is weak after long days of speaking No history of Smoking Denies any recent ear, nose, throat infection

## 2025-07-16 NOTE — PHYSICAL EXAM
[Normal] : mucosa is normal [Midline] : trachea located in midline position [Laryngoscopy Performed] : laryngoscopy was performed, see procedure section for findings [FreeTextEntry8] : cerumen impaction. removed [FreeTextEntry9] : cerumen impaction. removed